# Patient Record
Sex: FEMALE | Race: AMERICAN INDIAN OR ALASKA NATIVE
[De-identification: names, ages, dates, MRNs, and addresses within clinical notes are randomized per-mention and may not be internally consistent; named-entity substitution may affect disease eponyms.]

---

## 2019-08-12 ENCOUNTER — HOSPITAL ENCOUNTER (INPATIENT)
Dept: HOSPITAL 5 - LD | Age: 29
LOS: 2 days | Discharge: HOME | End: 2019-08-14
Attending: OBSTETRICS & GYNECOLOGY | Admitting: OBSTETRICS & GYNECOLOGY
Payer: MEDICAID

## 2019-08-12 DIAGNOSIS — E66.01: ICD-10-CM

## 2019-08-12 DIAGNOSIS — Z23: ICD-10-CM

## 2019-08-12 DIAGNOSIS — Z3A.39: ICD-10-CM

## 2019-08-12 DIAGNOSIS — J45.909: ICD-10-CM

## 2019-08-12 LAB
HCT VFR BLD CALC: 30 % (ref 30.3–42.9)
HGB BLD-MCNC: 9.6 GM/DL (ref 10.1–14.3)
MCHC RBC AUTO-ENTMCNC: 32 % (ref 30–34)
MCV RBC AUTO: 74 FL (ref 79–97)
PLATELET # BLD: 413 K/MM3 (ref 140–440)
RBC # BLD AUTO: 4.04 M/MM3 (ref 3.65–5.03)

## 2019-08-12 PROCEDURE — 86900 BLOOD TYPING SEROLOGIC ABO: CPT

## 2019-08-12 PROCEDURE — 85027 COMPLETE CBC AUTOMATED: CPT

## 2019-08-12 PROCEDURE — 86901 BLOOD TYPING SEROLOGIC RH(D): CPT

## 2019-08-12 PROCEDURE — 00HU33Z INSERTION OF INFUSION DEVICE INTO SPINAL CANAL, PERCUTANEOUS APPROACH: ICD-10-PCS | Performed by: ANESTHESIOLOGY

## 2019-08-12 PROCEDURE — 36415 COLL VENOUS BLD VENIPUNCTURE: CPT

## 2019-08-12 PROCEDURE — 3E0R3BZ INTRODUCTION OF ANESTHETIC AGENT INTO SPINAL CANAL, PERCUTANEOUS APPROACH: ICD-10-PCS | Performed by: ANESTHESIOLOGY

## 2019-08-12 PROCEDURE — 85018 HEMOGLOBIN: CPT

## 2019-08-12 PROCEDURE — 90471 IMMUNIZATION ADMIN: CPT

## 2019-08-12 PROCEDURE — 86592 SYPHILIS TEST NON-TREP QUAL: CPT

## 2019-08-12 PROCEDURE — 0KQM0ZZ REPAIR PERINEUM MUSCLE, OPEN APPROACH: ICD-10-PCS | Performed by: OBSTETRICS & GYNECOLOGY

## 2019-08-12 PROCEDURE — 3E033VJ INTRODUCTION OF OTHER HORMONE INTO PERIPHERAL VEIN, PERCUTANEOUS APPROACH: ICD-10-PCS | Performed by: OBSTETRICS & GYNECOLOGY

## 2019-08-12 PROCEDURE — 86850 RBC ANTIBODY SCREEN: CPT

## 2019-08-12 PROCEDURE — 85014 HEMATOCRIT: CPT

## 2019-08-12 RX ADMIN — IBUPROFEN SCH MG: 600 TABLET, FILM COATED ORAL at 20:55

## 2019-08-12 RX ADMIN — SODIUM CHLORIDE, SODIUM LACTATE, POTASSIUM CHLORIDE, AND CALCIUM CHLORIDE SCH MLS/HR: .6; .31; .03; .02 INJECTION, SOLUTION INTRAVENOUS at 15:20

## 2019-08-12 RX ADMIN — SODIUM CHLORIDE, SODIUM LACTATE, POTASSIUM CHLORIDE, AND CALCIUM CHLORIDE SCH MLS/HR: .6; .31; .03; .02 INJECTION, SOLUTION INTRAVENOUS at 12:22

## 2019-08-12 NOTE — ANESTHESIA CONSULTATION
Anesthesia Consult and Med Hx


Date of service: 08/12/19





- Airway


Anesthetic Teeth Evaluation: Good


ROM Head & Neck: Adequate


Mental/Hyoid Distance: Adequate


Mallampati Class: Class II


Intubation Access Assessment: Probably Good





- Pulmonary Exam


CTA: Yes





- Pre-Operative Health Status


ASA Pre-Surgery Classification: ASA1, ASA2, ASA3


Proposed Anesthetic Plan: Epidural





- Pulmonary


Hx Asthma: Yes (inhaler, last asthma attach years ago per pt,)


COPD: No


Hx Pneumonia: No





- Cardiovascular System


Hx Hypertension: No





- Central Nervous System


Hx Seizures: No


Hx Psychiatric Problems: No





- Endocrine


Hx Renal Disease: No


Hx End Stage Renal Disease: No


Hx Hypothyroidism: No


Hx Hyperthyroidism: No





- Hematic


Hx Anemia: No


Hx Sickle Cell Disease: No





- Other Systems


Hx Alcohol Use: No


Hx Obesity: Yes

## 2019-08-12 NOTE — PROCEDURE NOTE
OB Delivery Note





- Delivery


Date of Delivery: 19


Surgeon: ALENA FUNES


Estimated blood loss: 500cc





- Vaginal


Delivery presentation: vertex


Delivery position: OP


Delivery induction: oxytocin


Delivery monitor: external FHT, external uterine


Route of delivery: vacuum extraction


Delivery placenta: spontaneous


Delivery cord: nuchal cord


Episiotomy: none


Delivery laceration: 2nd degree


Delivery repair: vicryl


Anesthesia: epidural


Delivery comments: 





Patient was noted to be c/c/ 0 and commenced to delivering a viable male infant 

at 1635 with assistant of vacuum.  She pushed and delivered in direct OP  

presentation.  Vacuum placed after bladder emptied. It was pumped up until on 

green and within 3 pushes delivered infant. Saloni performed with easy 

delivery of shoulders .  Apgars noted to be 8 and 9. First degree laceration 

repaired with 3-0 vicry. Placenta delivered at 1442 intact with 3 vessel cord. 

 cc. Cytotec given 800ug.  Repaired 2nd  degree lac with 2-0 vicryl in 

normal usual .  Weight 6 pounds 10 oz. Patient tolerated procedure well. 








- Infant


  ** A


Apgar at 1 minute: 8


Apgar at 5 minutes: 9


Infant Gender: Male (weight 6 pounds 10 oz)

## 2019-08-12 NOTE — HISTORY AND PHYSICAL REPORT
History of Present Illness


Date of examination: 19


Date of admission: 


19 09:26





Chief complaint: 





IOL recommended by [facility] for [place] 


History of present illness: 





This is a 29 yo  at 39 weeks EDC 19 here for IOL for Morbid obesity 

recommneded by [facility]. She has hx of obesity, HSV no outbreaks on meds, HX of 

asthma. Patient of [facility] seen since 31 weeks late care 





Past History


Past Medical History: no pertinent history


Past Surgical History: no surgical history


GYN History: herpes


Family/Genetic History: none


Social history: no significant social history, single.  denies: smoking, alcohol

abuse, prescription drug abuse





- Obstetrical History


Expected Date of Delivery: 19


Actual Gestation: 39 Week(s) 0 Day(s) 


: 2


Para: 1


Hx # Term Pregnancies: 1


Number of  Pregnancies: 0


Spontaneous Abortions: 0


Induced : 0


Number of Living Children: 1





Medications and Allergies


                                    Allergies











Allergy/AdvReac Type Severity Reaction Status Date / Time


 


pollen extracts Allergy  Itching Verified 19 10:21











Active Meds: 


Active Medications





Ephedrine Sulfate (Ephedrine Sulfate)  10 mg IV Q2M PRN


   PRN Reason: Hypotension


Oxytocin/Sodium Chloride (Pitocin/Ns 20 Unit/1000ml Drip)  20 units in 1,000 mls

@ 125 mls/hr IV AS DIRECT OSFIA


Oxytocin/Sodium Chloride (Pitocin/Ns 30 Unit/500ml)  30 units in 500 mls @ 2 

mls/hr IV TITR SOFIA; Protocol


Lactated Ringer's (Lactated Ringers)  1,000 mls @ 125 mls/hr IV AS DIRECT SOFIA


   Last Admin: 19 12:22 Dose:  125 mls/hr


   Documented by: 


Mineral Oil (Mineral Oil)  30 ml PO QHS PRN


   PRN Reason: Constipation


Terbutaline Sulfate (Brethine)  0.25 mg SUB-Q ONCE PRN


   PRN Reason: Hyperstimulation/Hypertonicity


Terbutaline Sulfate (Brethine)  0.25 mg IVP ONCE PRN


   PRN Reason: Hyperstimulation/Hypertonicity











Review of Systems


All systems: negative





- Vital Signs


Vital signs: 


                                   Vital Signs











Pulse BP


 


 85   112/69 


 


 19 09:47  19 09:47








                                        











Temp Pulse Resp BP Pulse Ox


 


 98.0 F   85      110/50    


 


 19 09:50  19 12:31     19 12:31   














- Physical Exam


Breasts: Positive: normal


Cardiovascular: Regular rate, Normal S1


Lungs: Positive: Clear to auscultation, Normal air movement


Abdomen: Positive: normal appearance, soft, normal bowel sounds.  Negative: 

distention, tenderness, guarding


Genitourinary (Female): Positive: normal external genitalia, normal perenium


Vagina: Positive: normal moisture


Uterus: Positive: normal size, normal contour


Anus/Rectum: Positive: normal perianal skin, heme negative


Extremities: Positive: normal


Deep Tendon Reflex Grade: Normal +2





- Obstetrical


FHR: category 1


Cervical Dilatation: 3


Uterine Contraction Pattern: Regular


Uterine Tone Measurement Phase: Contraction


Uterine Contraction Intensity: Mild





Results


Result Diagrams: 


                                 19 11:29





                              Abnormal lab results











  19 Range/Units





  11:29 


 


Hgb  9.6 L  (10.1-14.3)  gm/dl


 


Hct  30.0 L  (30.3-42.9)  %


 


MCV  74 L  (79-97)  fl


 


MCH  24 L  (28-32)  pg


 


RDW  23.2 H  (13.2-15.2)  %








All other labs normal.








Assessment and Plan





A/P 


IUP 39 weeks 


Morbid obesity 


HSV2 


Chronic anemia 


Asthma 





admit for IOL for obeisty


no lesion snoted on perineum


IVF, labs


offer epidureal 


expect vaginal delivery

## 2019-08-13 LAB
HCT VFR BLD CALC: 26 % (ref 30.3–42.9)
HGB BLD-MCNC: 8.4 GM/DL (ref 10.1–14.3)

## 2019-08-13 PROCEDURE — 3E0234Z INTRODUCTION OF SERUM, TOXOID AND VACCINE INTO MUSCLE, PERCUTANEOUS APPROACH: ICD-10-PCS | Performed by: OBSTETRICS & GYNECOLOGY

## 2019-08-13 RX ADMIN — HYDROCODONE BITARTRATE AND ACETAMINOPHEN PRN EACH: 5; 325 TABLET ORAL at 16:10

## 2019-08-13 RX ADMIN — DOCUSATE SODIUM SCH MG: 100 CAPSULE, LIQUID FILLED ORAL at 21:40

## 2019-08-13 RX ADMIN — IBUPROFEN SCH MG: 600 TABLET, FILM COATED ORAL at 02:21

## 2019-08-13 RX ADMIN — DOCUSATE SODIUM SCH MG: 100 CAPSULE, LIQUID FILLED ORAL at 11:50

## 2019-08-13 RX ADMIN — Medication SCH EACH: at 11:50

## 2019-08-13 RX ADMIN — IBUPROFEN SCH MG: 600 TABLET, FILM COATED ORAL at 19:36

## 2019-08-13 RX ADMIN — HYDROCODONE BITARTRATE AND ACETAMINOPHEN PRN EACH: 5; 325 TABLET ORAL at 21:40

## 2019-08-13 RX ADMIN — IBUPROFEN SCH MG: 600 TABLET, FILM COATED ORAL at 14:30

## 2019-08-13 RX ADMIN — DOCUSATE SODIUM SCH MG: 100 CAPSULE, LIQUID FILLED ORAL at 02:21

## 2019-08-13 NOTE — PROGRESS NOTE
Assessment and Plan





A/P 


PPD1 s/p  


vss


doing well 


acute anemia on iron 


discharge home tomorrow 





Subjective





- Subjective


Date of service: 19


Principal diagnosis: s/p 


Interval history: 





This is a 29 yo  at 39 weeks EDC 19 here for IOL for Morbid obesity 

recommneded by [facility]. She has hx of obesity, HSV no outbreaks on meds, HX of 

asthma. Patient of [facility] seen since 31 weeks late care 


Patient reports: appetite normal, voiding normally, pain well controlled, 

flatus, ambulating normally


Baxter: doing well





Objective





- Vital Signs


Latest vital signs: 


                                   Vital Signs











  Temp Pulse Resp BP BP Pulse Ox


 


 19 04:00  98.4 F  71  18   117/72 


 


 19 00:00  98.6 F  62  18   116/67 


 


 19 20:55    18   


 


 19 20:00  98.7 F  81  20   119/69  99


 


 19 18:43   85     98


 


 19 18:38   86     100


 


 19 18:33   87     93


 


 19 18:31   82     79 L


 


 19 18:28   78     100


 


 19 18:23   77     100


 


 19 18:18   83   120/63   100


 


 19 18:16   76     93


 


 19 18:12   79     97


 


 19 18:10   98 H     85


 


 19 18:07   82     100


 


 19 18:02   81     99


 


 19 17:57   80     99


 


 19 17:52   75     92


 


 19 17:47   85     100


 


 19 17:42   76     100


 


 19 17:39   77     90


 


 19 17:37   79     98


 


 19 17:32   74     98


 


 19 17:31   84     79 L


 


 19 17:29   70   111/54  


 


 19 17:27   79     92


 


 19 17:25   84     89


 


 19 17:22   80     93


 


 19 17:17   89     98


 


 19 17:14   73     0 L


 


 19 17:12   90     100


 


 19 17:07   93 H     100


 


 19 17:02   86     91


 


 19 16:59  97.9 F   16   


 


 19 16:57   73     100


 


 19 16:53   84     91


 


 19 16:52   95 H     100


 


 19 16:50   86   112/58  


 


 19 16:47   76     100


 


 19 16:42   83     100


 


 19 16:37   86     100


 


 19 16:32   103 H     100


 


 19 16:31   190 H     100


 


 19 16:29  97.6 F   20   


 


 19 16:26   86     99


 


 19 16:21   79     99


 


 19 16:19   85   105/56  


 


 19 16:16   82     97


 


 19 16:11   81     97


 


 19 16:06   75     100


 


 19 16:03   76   128/74  


 


 19 16:01   71   124/71   100


 


 19 15:59   80   123/66  


 


 19 15:57   82   121/67  


 


 19 15:56   77     100


 


 19 15:55   66   125/67  


 


 19 15:53   81   125/67  


 


 19 15:51   79   124/65   100


 


 19 15:49   76   123/67  


 


 19 15:47   79   126/70  


 


 19 15:46   78     100


 


 19 15:45   80   123/69  


 


 19 15:43   78   121/72  


 


 19 15:41   71   121/69   100


 


 19 15:39   81   124/71  


 


 19 15:37   86   122/68  


 


 19 15:36   80     100


 


 19 15:35   82   120/67  


 


 19 15:33   85   117/71  


 


 19 15:31   85   123/68   100


 


 19 15:29   85   123/70  


 


 19 15:27   101 H   121/64  


 


 19 15:26   81     100


 


 19 15:25   91 H   120/68  


 


 19 15:23   87   118/73  


 


 19 15:21   85   121/70   100


 


 19 15:19   90   118/66  


 


 19 15:17   84   120/64  


 


 19 15:16   98 H     99


 


 19 15:15   92 H   120/66  


 


 19 15:13   96 H   123/57  


 


 19 15:11   86     100


 


 19 15:09   96 H   122/73  


 


 19 15:07   96 H   145/87  


 


 19 15:06   109 H     100


 


 19 15:05   101 H   141/86  


 


 19 15:03   92 H   138/87   93


 


 19 15:01   104 H   139/86   99


 


 19 14:56   88     100


 


 19 14:52   96 H   138/65  


 


 19 14:51   100 H     97


 


 19 14:50   75     83 L


 


 19 14:46   101 H     98


 


 19 14:41   114 H     100


 


 19 12:59   76   113/76  


 


 19 12:31   85   110/50  


 


 19 10:12   75   118/64  


 


 19 09:50  98.0 F     


 


 19 09:47   85   112/69  








                                Intake and Output











 19





 23:59 07:59 15:59


 


Intake Total  200 


 


Output Total 300  


 


Balance -300 200 


 


Intake:   


 


  Oral  200 


 


Output:   


 


  Urine 300  


 


    Indwelling 150  


 


    Indwelling Catheter 150  


 


Other:   


 


  Total, Intake Amount  200 


 


  Total, Output Amount 150  


 


  # Voids   


 


    Void 1  


 


  Estimated Blood Loss 500  














- Exam


Breasts: Present: normal


Cardiovascular: Present: Regular rate, Normal S1


Lungs: Present: Clear to auscultation, Normal air movement


Abdomen: Present: normal appearance, soft, normal bowel sounds.  Absent: 

distention, tenderness, guarding


Vulva: both: normal


Uterus: Present: normal, firm, fundal height below umbilicus.  Absent: 

bogginess, tenderness


Extremities: Present: normal


Deep Tendon Reflex Grade: Normal +2





- Labs


Labs: 


                              Abnormal lab results











  19 Range/Units





  11:29 04:34 


 


Hgb  9.6 L  8.4 L  (10.1-14.3)  gm/dl


 


Hct  30.0 L  26.0 L  (30.3-42.9)  %


 


MCV  74 L   (79-97)  fl


 


MCH  24 L   (28-32)  pg


 


RDW  23.2 H   (13.2-15.2)  %

## 2019-08-13 NOTE — DISCHARGE SUMMARY
Providers





- Providers


Date of Admission: 


19 09:26





Date of discharge: 08/15/19


Attending physician: 


ALENA FUNES MD





Primary care physician: 


ALENA FUNES MD








Hospitalization


Reason for admission: induction of labor


Delivery: 


Episiotomy: none


Laceration: 2nd degree


Incision: normal, dry, intact


Other postpartum procedures: none


Postpartum complications: none


Discharge diagnosis: IUP at term delivered


 baby: male


Hospital course: 





unremarkable hospital course


Condition at discharge: Good


Disposition: DC-01 TO HOME OR SELFCARE





Plan





- Discharge Medications


Prescriptions: 


Ferrous Sulfate [Feosol 325 MG tab] 325 mg PO TID #30 tablet


Ibuprofen [Motrin] 600 mg PO Q8H PRN #30 tablet


 PRN Reason: Pain





- Provider Discharge Summary


Activity: routine, no sex for 6 weeks, no strenuous exercise


Diet: routine


Instructions: routine


Additional instructions: 


[]  Smoking cessation referral if applicable(refer to patient education folder 

for contact #)


[]  Refer to [facility] Booklet








Call your doctor immediately for:


* Fever > 100.5


* Heavy vaginal bleeding ( >1 pad per hour)


* Severe persistent headache


* Shortness of breath


* Reddened, hot, painful area to leg or breast


* Drainage or odor from incision.





* Keep incision clean and dry at all times and follow doctor's instructions re

garding bathing/showering











- Follow up plan


Follow up: 


ALENA FUNES MD [Primary Care Provider] - 19

## 2019-08-14 VITALS — SYSTOLIC BLOOD PRESSURE: 117 MMHG | DIASTOLIC BLOOD PRESSURE: 71 MMHG

## 2019-08-14 RX ADMIN — HYDROCODONE BITARTRATE AND ACETAMINOPHEN PRN EACH: 5; 325 TABLET ORAL at 06:01

## 2019-08-14 RX ADMIN — Medication SCH EACH: at 09:13

## 2019-08-14 RX ADMIN — DOCUSATE SODIUM SCH MG: 100 CAPSULE, LIQUID FILLED ORAL at 09:13

## 2019-08-14 NOTE — PROGRESS NOTE
Assessment and Plan


A/P


PPD2 s/p VAVD


vital signs stable


anemia- needs iron


Reviewed breast feeding education


Discharge to home today





Subjective





- Subjective


Date of service: 19


Principal diagnosis: s/p 


Interval history: 


Pt is PPD2 s/p VAVD, 2nd degree lac, 


Patient reports: appetite normal, voiding normally, pain well controlled, 

ambulating normally


: doing well, bottle feeding (attempting to nurse, poor latch)





Objective





- Vital Signs


Latest vital signs: 


                                   Vital Signs











  Temp Resp BP


 


 19 17:42  97.9 F  18  137/59


 


 19 12:21  97.9 F  18  111/64


 


 19 09:34  97.7 F  18  118/74








                                Intake and Output











 19





 23:59 07:59 15:59


 


Intake Total  200 


 


Balance  200 


 


Intake:   


 


  Oral  200 


 


Other:   


 


  Total, Intake Amount  200 














- Exam


Breasts: Present: normal


Cardiovascular: Present: Regular rate, Normal S1, Normal S2, No murmurs


Lungs: Present: Clear to auscultation, Normal air movement


Abdomen: Present: normal appearance, soft.  Absent: distention, tenderness, 

guarding


Uterus: Present: normal, firm, fundal height below umbilicus


Extremities: Present: normal